# Patient Record
Sex: MALE | Race: WHITE | NOT HISPANIC OR LATINO
[De-identification: names, ages, dates, MRNs, and addresses within clinical notes are randomized per-mention and may not be internally consistent; named-entity substitution may affect disease eponyms.]

---

## 2017-02-28 ENCOUNTER — APPOINTMENT (OUTPATIENT)
Dept: VASCULAR SURGERY | Facility: CLINIC | Age: 62
End: 2017-02-28

## 2017-02-28 VITALS
SYSTOLIC BLOOD PRESSURE: 126 MMHG | BODY MASS INDEX: 24.25 KG/M2 | TEMPERATURE: 97.9 F | HEART RATE: 62 BPM | HEIGHT: 68 IN | WEIGHT: 160 LBS | DIASTOLIC BLOOD PRESSURE: 81 MMHG

## 2019-12-17 ENCOUNTER — APPOINTMENT (OUTPATIENT)
Dept: VASCULAR SURGERY | Facility: CLINIC | Age: 64
End: 2019-12-17
Payer: COMMERCIAL

## 2019-12-17 VITALS
WEIGHT: 165 LBS | SYSTOLIC BLOOD PRESSURE: 140 MMHG | HEIGHT: 67 IN | TEMPERATURE: 98 F | BODY MASS INDEX: 25.9 KG/M2 | DIASTOLIC BLOOD PRESSURE: 76 MMHG | HEART RATE: 61 BPM

## 2019-12-17 PROCEDURE — 93880 EXTRACRANIAL BILAT STUDY: CPT

## 2019-12-17 PROCEDURE — 99213 OFFICE O/P EST LOW 20 MIN: CPT

## 2019-12-17 NOTE — PHYSICAL EXAM
[Normal Breath Sounds] : Normal breath sounds [Normal Heart Sounds] : normal heart sounds [2+] : left 2+ [Ankle Swelling (On Exam)] : not present [Varicose Veins Of Lower Extremities] : not present [] : not present [Abdomen Tenderness] : ~T ~M No abdominal tenderness [Abdomen Masses] : No abdominal masses [de-identified] : well appearing

## 2019-12-17 NOTE — HISTORY OF PRESENT ILLNESS
[de-identified] : 63M s/p right symptomatic CEA 2010, hx of CABG here for follow up. Office duplex patent CEA and left ANTHONY <50%. Pt denies any TIA, stroke since surgery. Doing well.

## 2019-12-17 NOTE — ASSESSMENT
[FreeTextEntry1] : 63M s/p right CEA (2010) doing well. Office duplex shows patent right CEA, left ANTHONY <50%\par - Follow up in 1 year  He is  11 years  post procedure  of  right  CEA following  CABG  . Doing  well  [Carotid Endarterectomy] : carotid endarterectomy

## 2021-11-10 ENCOUNTER — APPOINTMENT (OUTPATIENT)
Dept: VASCULAR SURGERY | Facility: CLINIC | Age: 66
End: 2021-11-10
Payer: MEDICARE

## 2021-11-10 VITALS
OXYGEN SATURATION: 95 % | TEMPERATURE: 97.5 F | HEIGHT: 67 IN | RESPIRATION RATE: 16 BRPM | BODY MASS INDEX: 25.74 KG/M2 | WEIGHT: 164 LBS | SYSTOLIC BLOOD PRESSURE: 149 MMHG | HEART RATE: 61 BPM | DIASTOLIC BLOOD PRESSURE: 87 MMHG

## 2021-11-10 VITALS — DIASTOLIC BLOOD PRESSURE: 88 MMHG | SYSTOLIC BLOOD PRESSURE: 145 MMHG

## 2021-11-10 DIAGNOSIS — Z86.39 PERSONAL HISTORY OF OTHER ENDOCRINE, NUTRITIONAL AND METABOLIC DISEASE: ICD-10-CM

## 2021-11-10 DIAGNOSIS — I65.29 OCCLUSION AND STENOSIS OF UNSPECIFIED CAROTID ARTERY: ICD-10-CM

## 2021-11-10 DIAGNOSIS — Z86.79 PERSONAL HISTORY OF OTHER DISEASES OF THE CIRCULATORY SYSTEM: ICD-10-CM

## 2021-11-10 DIAGNOSIS — Z78.9 OTHER SPECIFIED HEALTH STATUS: ICD-10-CM

## 2021-11-10 DIAGNOSIS — I25.728 ATHEROSCLEROSIS OF AUTOLOGOUS ARTERY CORONARY ARTERY BYPASS GRAFT(S) WITH OTHER FORMS OF ANGINA PECTORIS: ICD-10-CM

## 2021-11-10 PROCEDURE — 99213 OFFICE O/P EST LOW 20 MIN: CPT

## 2021-11-10 PROCEDURE — 93880 EXTRACRANIAL BILAT STUDY: CPT

## 2021-11-10 NOTE — PHYSICAL EXAM
[2+] : left 2+ [1+] : left 1+ [Ankle Swelling (On Exam)] : not present [Varicose Veins Of Lower Extremities] : not present [] : not present

## 2021-11-10 NOTE — ASSESSMENT
[Carotid Endarterectomy] : carotid endarterectomy [FreeTextEntry1] : US carotids  show no  evidence of recurrent  stenosis  Contralateral less than 50 %  Return in 2 years  for  Carotid uSContinue his  lifestyle  changes He is  12 years  post CEA

## 2021-11-10 NOTE — HISTORY OF PRESENT ILLNESS
[FreeTextEntry1] : No change in meds  asymptomatic   Doing  well with exercise  and  active  model citizen  trying to control  Atherosclerosis  CABG in 2009

## 2023-03-09 ENCOUNTER — OFFICE (OUTPATIENT)
Dept: URBAN - METROPOLITAN AREA CLINIC 12 | Facility: CLINIC | Age: 68
Setting detail: OPHTHALMOLOGY
End: 2023-03-09
Payer: MEDICARE

## 2023-03-09 DIAGNOSIS — H35.033: ICD-10-CM

## 2023-03-09 DIAGNOSIS — Z96.1: ICD-10-CM

## 2023-03-09 DIAGNOSIS — H04.123: ICD-10-CM

## 2023-03-09 DIAGNOSIS — H43.392: ICD-10-CM

## 2023-03-09 DIAGNOSIS — E11.9: ICD-10-CM

## 2023-03-09 DIAGNOSIS — H43.811: ICD-10-CM

## 2023-03-09 PROCEDURE — 92250 FUNDUS PHOTOGRAPHY W/I&R: CPT | Performed by: OPHTHALMOLOGY

## 2023-03-09 PROCEDURE — 92014 COMPRE OPH EXAM EST PT 1/>: CPT | Performed by: OPHTHALMOLOGY

## 2023-03-09 ASSESSMENT — REFRACTION_CURRENTRX
OD_OVR_VA: 20/
OD_AXIS: 175
OS_SPHERE: +0.50
OS_AXIS: 075
OS_OVR_VA: 20/
OD_SPHERE: +0.25
OD_ADD: +2.50
OS_ADD: +2.50
OD_VPRISM_DIRECTION: BF
OD_CYLINDER: -0.25
OS_CYLINDER: -0.50
OS_VPRISM_DIRECTION: BF

## 2023-03-09 ASSESSMENT — AXIALLENGTH_DERIVED
OD_AL: 22.7908
OD_AL: 22.8364
OS_AL: 22.7455
OD_AL: 22.7908
OS_AL: 22.7455
OS_AL: 22.7455

## 2023-03-09 ASSESSMENT — REFRACTION_MANIFEST
OD_CYLINDER: -0.25
OD_CYLINDER: -0.50
OD_ADD: +2.50
OD_AXIS: 165
OS_CYLINDER: -0.50
OD_AXIS: 154
OS_AXIS: 72
OS_VA1: 20/20-
OD_SPHERE: +0.25
OS_SPHERE: +0.50
OS_SPHERE: +0.50
OD_SPHERE: +0.25
OS_VA1: 20/20-1
OD_ADD: +2.50
OD_VA1: 20/20
OS_CYLINDER: -0.50
OD_VA1: 20/20
OS_AXIS: 073
OS_ADD: +2.50
OS_ADD: +2.50

## 2023-03-09 ASSESSMENT — SUPERFICIAL PUNCTATE KERATITIS (SPK)
OD_SPK: T
OS_SPK: T

## 2023-03-09 ASSESSMENT — REFRACTION_AUTOREFRACTION
OS_SPHERE: +0.75
OS_CYLINDER: -1.00
OD_SPHERE: +0.50
OS_AXIS: 069
OD_AXIS: 147
OD_CYLINDER: -0.75

## 2023-03-09 ASSESSMENT — KERATOMETRY
OD_K2POWER_DIOPTERS: 46.50
OS_AXISANGLE_DEGREES: 158
OS_K2POWER_DIOPTERS: 46.00
OS_K1POWER_DIOPTERS: 45.25
OD_AXISANGLE_DEGREES: 016
METHOD_AUTO_MANUAL: AUTO
OD_K1POWER_DIOPTERS: 44.75

## 2023-03-09 ASSESSMENT — SPHEQUIV_DERIVED
OS_SPHEQUIV: 0.25
OD_SPHEQUIV: 0
OD_SPHEQUIV: 0.125
OS_SPHEQUIV: 0.25
OS_SPHEQUIV: 0.25
OD_SPHEQUIV: 0.125

## 2023-03-09 ASSESSMENT — CONFRONTATIONAL VISUAL FIELD TEST (CVF)
OD_FINDINGS: FULL
OS_FINDINGS: FULL

## 2023-03-09 ASSESSMENT — VISUAL ACUITY
OS_BCVA: 20/20
OD_BCVA: 20/20-2

## 2023-03-09 ASSESSMENT — TONOMETRY
OD_IOP_MMHG: 16
OS_IOP_MMHG: 13

## 2023-04-06 PROBLEM — H04.123 DRY EYE; BOTH EYES ;
INC OSM OU: Status: ACTIVE | Noted: 2023-04-06

## 2023-04-18 ENCOUNTER — APPOINTMENT (OUTPATIENT)
Dept: VASCULAR SURGERY | Facility: CLINIC | Age: 68
End: 2023-04-18
Payer: MEDICARE

## 2023-04-18 VITALS
TEMPERATURE: 97.6 F | RESPIRATION RATE: 16 BRPM | SYSTOLIC BLOOD PRESSURE: 124 MMHG | OXYGEN SATURATION: 99 % | HEIGHT: 65 IN | BODY MASS INDEX: 26.16 KG/M2 | WEIGHT: 157 LBS | HEART RATE: 55 BPM | DIASTOLIC BLOOD PRESSURE: 70 MMHG

## 2023-04-18 PROCEDURE — 99213 OFFICE O/P EST LOW 20 MIN: CPT

## 2023-04-18 PROCEDURE — 93880 EXTRACRANIAL BILAT STUDY: CPT

## 2023-04-18 NOTE — HISTORY OF PRESENT ILLNESS
[FreeTextEntry1] : 67-year-old gentleman status post carotid endarterectomy on the right 13 years ago.  He has had a CABG at that time 2.  Since then he has had extensive lifestyle modification.  He is doing well without issues.  Last seen in the office about 2 years ago.

## 2023-04-18 NOTE — ASSESSMENT
[FreeTextEntry1] : Patient with known carotid stenosis on the right status post endarterectomy with patch there is no evidence of Stenosis.  Contralateral side shows no significant stenosis.  I do not believe we need to continue with surveillance.  The likelihood of a patch restenosis is very low and generally benign anyway.  Follow-up as needed.  Continue doing what he is doing.

## 2023-04-18 NOTE — PHYSICAL EXAM
[Alert] : alert [Oriented to Person] : oriented to person [Oriented to Place] : oriented to place [Oriented to Time] : oriented to time [de-identified] : Appears well in no acute distress [de-identified] : Incision site is clean [de-identified] : Normal work of breathing

## 2023-05-27 PROBLEM — H01.015 BLEPHARITIS; RIGHT UPPER LID, RIGHT LOWER LID, LEFT UPPER LID, LEFT LOWER LID: Status: ACTIVE | Noted: 2023-05-17

## 2023-05-27 PROBLEM — H01.012 BLEPHARITIS; RIGHT UPPER LID, RIGHT LOWER LID, LEFT UPPER LID, LEFT LOWER LID: Status: ACTIVE | Noted: 2023-05-17

## 2023-05-27 PROBLEM — H01.014 BLEPHARITIS; RIGHT UPPER LID, RIGHT LOWER LID, LEFT UPPER LID, LEFT LOWER LID: Status: ACTIVE | Noted: 2023-05-17

## 2023-05-27 PROBLEM — H01.011 BLEPHARITIS; RIGHT UPPER LID, RIGHT LOWER LID, LEFT UPPER LID, LEFT LOWER LID: Status: ACTIVE | Noted: 2023-05-17

## 2023-11-22 PROBLEM — H01.015E BLEPHARITIS; RIGHT UPPER LID, RIGHT LOWER LID, LEFT UPPER LID, LEFT LOWER LID: Status: ACTIVE | Noted: 2023-11-22

## 2023-11-22 PROBLEM — H01.011 BLEPHARITIS; RIGHT UPPER LID, RIGHT LOWER LID, LEFT UPPER LID, LEFT LOWER LID: Status: ACTIVE | Noted: 2023-11-22

## 2023-11-22 PROBLEM — H01.014 BLEPHARITIS; RIGHT UPPER LID, RIGHT LOWER LID, LEFT UPPER LID, LEFT LOWER LID: Status: ACTIVE | Noted: 2023-11-22

## 2023-11-22 PROBLEM — H01.012 BLEPHARITIS; RIGHT UPPER LID, RIGHT LOWER LID, LEFT UPPER LID, LEFT LOWER LID: Status: ACTIVE | Noted: 2023-11-22

## 2024-02-01 ENCOUNTER — OFFICE (OUTPATIENT)
Dept: URBAN - METROPOLITAN AREA CLINIC 12 | Facility: CLINIC | Age: 69
Setting detail: OPHTHALMOLOGY
End: 2024-02-01
Payer: MEDICARE

## 2024-02-01 DIAGNOSIS — H35.033: ICD-10-CM

## 2024-02-01 DIAGNOSIS — D31.32: ICD-10-CM

## 2024-02-01 DIAGNOSIS — H04.123: ICD-10-CM

## 2024-02-01 DIAGNOSIS — H43.811: ICD-10-CM

## 2024-02-01 PROBLEM — H52.7 REFRACTIVE ERROR ; BOTH EYES: Status: ACTIVE | Noted: 2024-02-01

## 2024-02-01 PROCEDURE — 92250 FUNDUS PHOTOGRAPHY W/I&R: CPT | Performed by: OPHTHALMOLOGY

## 2024-02-01 PROCEDURE — 92014 COMPRE OPH EXAM EST PT 1/>: CPT | Performed by: OPHTHALMOLOGY

## 2024-02-01 ASSESSMENT — SUPERFICIAL PUNCTATE KERATITIS (SPK)
OS_SPK: T
OD_SPK: T

## 2024-02-01 ASSESSMENT — REFRACTION_MANIFEST
OD_SPHERE: +0.25
OS_VA1: 20/20-
OD_CYLINDER: -0.50
OD_CYLINDER: -0.25
OS_SPHERE: +0.50
OS_ADD: +2.50
OS_CYLINDER: -0.50
OD_VA1: 20/20
OD_AXIS: 154
OD_ADD: +2.50
OD_AXIS: 165
OS_ADD: +2.50
OD_ADD: +2.50
OS_AXIS: 073
OD_VA1: 20/20
OS_VA1: 20/20-1
OD_SPHERE: +0.25
OS_SPHERE: +0.50
OS_CYLINDER: -0.50
OS_AXIS: 72

## 2024-02-01 ASSESSMENT — REFRACTION_AUTOREFRACTION
OD_AXIS: 177
OD_SPHERE: PLANO
OS_AXIS: 068
OD_CYLINDER: -0.50
OS_CYLINDER: -0.75
OS_SPHERE: +0.50

## 2024-02-01 ASSESSMENT — SPHEQUIV_DERIVED
OD_SPHEQUIV: 0
OS_SPHEQUIV: 0.125
OD_SPHEQUIV: 0.125
OS_SPHEQUIV: 0.25
OS_SPHEQUIV: 0.25

## 2024-02-01 ASSESSMENT — REFRACTION_CURRENTRX
OS_OVR_VA: 20/
OD_ADD: +2.50
OS_VPRISM_DIRECTION: BF
OD_OVR_VA: 20/
OS_CYLINDER: -0.50
OD_SPHERE: +0.25
OS_ADD: +2.50
OD_AXIS: 175
OS_AXIS: 075
OD_VPRISM_DIRECTION: BF
OS_SPHERE: +0.50
OD_CYLINDER: -0.25

## 2024-02-01 ASSESSMENT — CONFRONTATIONAL VISUAL FIELD TEST (CVF)
OS_FINDINGS: FULL
OD_FINDINGS: FULL

## 2024-04-03 PROBLEM — H01.012 BLEPHARITIS; RIGHT UPPER LID, RIGHT LOWER LID, LEFT UPPER LID, LEFT LOWER LID: Status: ACTIVE | Noted: 2024-04-03

## 2024-04-03 PROBLEM — H01.014 BLEPHARITIS; RIGHT UPPER LID, RIGHT LOWER LID, LEFT UPPER LID, LEFT LOWER LID: Status: ACTIVE | Noted: 2024-04-03

## 2024-04-03 PROBLEM — H01.011 BLEPHARITIS; RIGHT UPPER LID, RIGHT LOWER LID, LEFT UPPER LID, LEFT LOWER LID: Status: ACTIVE | Noted: 2024-04-03

## 2024-06-21 PROBLEM — H01.002 BLEPHARITIS; RIGHT UPPER LID, RIGHT LOWER LID, LEFT UPPER LID, LEFT LOWER LID: Status: ACTIVE | Noted: 2024-06-21

## 2024-06-21 PROBLEM — H01.004 BLEPHARITIS; RIGHT UPPER LID, RIGHT LOWER LID, LEFT UPPER LID, LEFT LOWER LID: Status: ACTIVE | Noted: 2024-06-21

## 2024-06-21 PROBLEM — H01.001 BLEPHARITIS; RIGHT UPPER LID, RIGHT LOWER LID, LEFT UPPER LID, LEFT LOWER LID: Status: ACTIVE | Noted: 2024-06-21

## 2024-06-21 PROBLEM — H01.005 BLEPHARITIS; RIGHT UPPER LID, RIGHT LOWER LID, LEFT UPPER LID, LEFT LOWER LID: Status: ACTIVE | Noted: 2024-06-21

## 2025-01-08 PROBLEM — H01.002 BLEPHARITIS; RIGHT UPPER LID, RIGHT LOWER LID, LEFT UPPER LID, LEFT LOWER LID: Status: ACTIVE | Noted: 2025-01-07

## 2025-01-08 PROBLEM — H01.005 BLEPHARITIS; RIGHT UPPER LID, RIGHT LOWER LID, LEFT UPPER LID, LEFT LOWER LID: Status: ACTIVE | Noted: 2025-01-07

## 2025-01-08 PROBLEM — H01.001 BLEPHARITIS; RIGHT UPPER LID, RIGHT LOWER LID, LEFT UPPER LID, LEFT LOWER LID: Status: ACTIVE | Noted: 2025-01-07

## 2025-01-08 PROBLEM — H01.004 BLEPHARITIS; RIGHT UPPER LID, RIGHT LOWER LID, LEFT UPPER LID, LEFT LOWER LID: Status: ACTIVE | Noted: 2025-01-07

## 2025-02-19 PROBLEM — H01.005 BLEPHARITIS; RIGHT UPPER LID, RIGHT LOWER LID, LEFT UPPER LID, LEFT LOWER LID: Status: ACTIVE | Noted: 2025-02-18

## 2025-02-19 PROBLEM — H01.004 BLEPHARITIS; RIGHT UPPER LID, RIGHT LOWER LID, LEFT UPPER LID, LEFT LOWER LID: Status: ACTIVE | Noted: 2025-02-18

## 2025-02-19 PROBLEM — H01.001 BLEPHARITIS; RIGHT UPPER LID, RIGHT LOWER LID, LEFT UPPER LID, LEFT LOWER LID: Status: ACTIVE | Noted: 2025-02-18

## 2025-02-19 PROBLEM — H01.002 BLEPHARITIS; RIGHT UPPER LID, RIGHT LOWER LID, LEFT UPPER LID, LEFT LOWER LID: Status: ACTIVE | Noted: 2025-02-18

## 2025-04-29 ENCOUNTER — OFFICE (OUTPATIENT)
Dept: URBAN - METROPOLITAN AREA CLINIC 12 | Facility: CLINIC | Age: 70
Setting detail: OPHTHALMOLOGY
End: 2025-04-29
Payer: MEDICARE

## 2025-04-29 DIAGNOSIS — H43.392: ICD-10-CM

## 2025-04-29 DIAGNOSIS — D31.32: ICD-10-CM

## 2025-04-29 DIAGNOSIS — H43.811: ICD-10-CM

## 2025-04-29 DIAGNOSIS — H04.123: ICD-10-CM

## 2025-04-29 DIAGNOSIS — E11.9: ICD-10-CM

## 2025-04-29 DIAGNOSIS — H35.033: ICD-10-CM

## 2025-04-29 PROBLEM — H01.001 BLEPHARITIS; RIGHT UPPER LID, RIGHT LOWER LID, LEFT UPPER LID, LEFT LOWER LID: Status: ACTIVE | Noted: 2025-04-29

## 2025-04-29 PROBLEM — H01.004 BLEPHARITIS; RIGHT UPPER LID, RIGHT LOWER LID, LEFT UPPER LID, LEFT LOWER LID: Status: ACTIVE | Noted: 2025-04-29

## 2025-04-29 PROBLEM — H01.002 BLEPHARITIS; RIGHT UPPER LID, RIGHT LOWER LID, LEFT UPPER LID, LEFT LOWER LID: Status: ACTIVE | Noted: 2025-04-29

## 2025-04-29 PROBLEM — D31.31 CHOROIDAL NEVUS, RIGHT EYE: Status: ACTIVE | Noted: 2025-04-29

## 2025-04-29 PROBLEM — H01.005 BLEPHARITIS; RIGHT UPPER LID, RIGHT LOWER LID, LEFT UPPER LID, LEFT LOWER LID: Status: ACTIVE | Noted: 2025-04-29

## 2025-04-29 PROCEDURE — 92250 FUNDUS PHOTOGRAPHY W/I&R: CPT | Performed by: OPHTHALMOLOGY

## 2025-04-29 PROCEDURE — 92014 COMPRE OPH EXAM EST PT 1/>: CPT | Performed by: OPHTHALMOLOGY

## 2025-04-29 ASSESSMENT — REFRACTION_AUTOREFRACTION
OD_CYLINDER: SPH
OD_SPHERE: PL
OS_SPHERE: +1.00
OS_CYLINDER: -1.00
OD_AXIS: 000
OS_AXIS: 075

## 2025-04-29 ASSESSMENT — REFRACTION_CURRENTRX
OS_ADD: +2.50
OD_SPHERE: +0.25
OS_AXIS: 075
OD_ADD: +2.50
OD_OVR_VA: 20/
OD_AXIS: 175
OS_SPHERE: +0.50
OD_CYLINDER: -0.25
OD_VPRISM_DIRECTION: BF
OS_CYLINDER: -0.50
OS_OVR_VA: 20/
OS_VPRISM_DIRECTION: BF

## 2025-04-29 ASSESSMENT — KERATOMETRY
OD_AXISANGLE_DEGREES: 010
OD_K1POWER_DIOPTERS: 44.50
METHOD_AUTO_MANUAL: AUTO
OS_K1POWER_DIOPTERS: 45.25
OS_AXISANGLE_DEGREES: 168
OD_K2POWER_DIOPTERS: 46.75
OS_K2POWER_DIOPTERS: 46.00

## 2025-04-29 ASSESSMENT — CONFRONTATIONAL VISUAL FIELD TEST (CVF)
OD_FINDINGS: FULL
OS_FINDINGS: FULL

## 2025-04-29 ASSESSMENT — REFRACTION_MANIFEST
OD_AXIS: 165
OD_CYLINDER: -0.50
OS_ADD: +2.50
OD_SPHERE: +0.25
OS_AXIS: 073
OS_AXIS: 72
OD_VA1: 20/20
OD_ADD: +2.50
OD_CYLINDER: -0.25
OS_CYLINDER: -0.50
OD_ADD: +2.50
OS_VA1: 20/20-
OS_CYLINDER: -0.50
OD_AXIS: 154
OD_VA1: 20/20
OD_SPHERE: +0.25
OS_SPHERE: +0.50
OS_ADD: +2.50
OS_SPHERE: +0.50
OS_VA1: 20/20-1

## 2025-04-29 ASSESSMENT — TONOMETRY
OS_IOP_MMHG: 19
OD_IOP_MMHG: 17

## 2025-04-29 ASSESSMENT — VISUAL ACUITY
OD_BCVA: 20/20-2
OS_BCVA: 20/20

## 2025-04-29 ASSESSMENT — SUPERFICIAL PUNCTATE KERATITIS (SPK)
OS_SPK: T
OD_SPK: T

## 2025-05-14 PROBLEM — H01.001 BLEPHARITIS; RIGHT UPPER LID, RIGHT LOWER LID, LEFT UPPER LID, LEFT LOWER LID: Status: ACTIVE | Noted: 2025-05-13

## 2025-05-14 PROBLEM — H01.004 BLEPHARITIS; RIGHT UPPER LID, RIGHT LOWER LID, LEFT UPPER LID, LEFT LOWER LID: Status: ACTIVE | Noted: 2025-05-13

## 2025-05-14 PROBLEM — H01.002 BLEPHARITIS; RIGHT UPPER LID, RIGHT LOWER LID, LEFT UPPER LID, LEFT LOWER LID: Status: ACTIVE | Noted: 2025-05-13

## 2025-05-14 PROBLEM — H01.005 BLEPHARITIS; RIGHT UPPER LID, RIGHT LOWER LID, LEFT UPPER LID, LEFT LOWER LID: Status: ACTIVE | Noted: 2025-05-13

## 2025-07-23 PROBLEM — H01.002 BLEPHARITIS; RIGHT UPPER LID, RIGHT LOWER LID, LEFT UPPER LID, LEFT LOWER LID: Status: ACTIVE | Noted: 2025-07-02

## 2025-07-23 PROBLEM — H01.001 BLEPHARITIS; RIGHT UPPER LID, RIGHT LOWER LID, LEFT UPPER LID, LEFT LOWER LID: Status: ACTIVE | Noted: 2025-07-02

## 2025-07-23 PROBLEM — H01.005 BLEPHARITIS; RIGHT UPPER LID, RIGHT LOWER LID, LEFT UPPER LID, LEFT LOWER LID: Status: ACTIVE | Noted: 2025-07-02

## 2025-07-23 PROBLEM — H01.004 BLEPHARITIS; RIGHT UPPER LID, RIGHT LOWER LID, LEFT UPPER LID, LEFT LOWER LID: Status: ACTIVE | Noted: 2025-07-02
